# Patient Record
Sex: FEMALE
[De-identification: names, ages, dates, MRNs, and addresses within clinical notes are randomized per-mention and may not be internally consistent; named-entity substitution may affect disease eponyms.]

---

## 2020-06-09 ENCOUNTER — HOSPITAL ENCOUNTER (OUTPATIENT)
Dept: HOSPITAL 95 - LAB SHORT | Age: 30
Discharge: HOME | End: 2020-06-09
Attending: PHYSICIAN ASSISTANT
Payer: COMMERCIAL

## 2020-06-09 DIAGNOSIS — R06.00: Primary | ICD-10-CM

## 2020-06-09 LAB
ALBUMIN SERPL BCP-MCNC: 3.8 G/DL (ref 3.4–5)
ALBUMIN/GLOB SERPL: 0.8 {RATIO} (ref 0.8–1.8)
ALT SERPL W P-5'-P-CCNC: 43 U/L (ref 12–78)
ANION GAP SERPL CALCULATED.4IONS-SCNC: 10 MMOL/L (ref 6–16)
AST SERPL W P-5'-P-CCNC: 25 U/L (ref 12–37)
BASOPHILS # BLD AUTO: 0.04 K/MM3 (ref 0–0.23)
BASOPHILS NFR BLD AUTO: 1 % (ref 0–2)
BILIRUB SERPL-MCNC: 0.2 MG/DL (ref 0.1–1)
BUN SERPL-MCNC: 12 MG/DL (ref 8–24)
CALCIUM SERPL-MCNC: 8.8 MG/DL (ref 8.5–10.1)
CHLORIDE SERPL-SCNC: 106 MMOL/L (ref 98–108)
CO2 SERPL-SCNC: 27 MMOL/L (ref 21–32)
CREAT SERPL-MCNC: 0.78 MG/DL (ref 0.4–1)
DEPRECATED RDW RBC AUTO: 43.9 FL (ref 35.1–46.3)
EOSINOPHIL # BLD AUTO: 0.16 K/MM3 (ref 0–0.68)
EOSINOPHIL NFR BLD AUTO: 2 % (ref 0–6)
ERYTHROCYTE [DISTWIDTH] IN BLOOD BY AUTOMATED COUNT: 14.1 % (ref 11.7–14.2)
GLOBULIN SER CALC-MCNC: 4.9 G/DL (ref 2.2–4)
GLUCOSE SERPL-MCNC: 89 MG/DL (ref 70–99)
HCT VFR BLD AUTO: 40.7 % (ref 33–51)
HGB BLD-MCNC: 13.6 G/DL (ref 11.5–16)
IMM GRANULOCYTES # BLD AUTO: 0.02 K/MM3 (ref 0–0.1)
IMM GRANULOCYTES NFR BLD AUTO: 0 % (ref 0–1)
LYMPHOCYTES # BLD AUTO: 1.59 K/MM3 (ref 0.84–5.2)
LYMPHOCYTES NFR BLD AUTO: 22 % (ref 21–46)
MCHC RBC AUTO-ENTMCNC: 33.4 G/DL (ref 31.5–36.5)
MCV RBC AUTO: 86 FL (ref 80–100)
MONOCYTES # BLD AUTO: 0.66 K/MM3 (ref 0.16–1.47)
MONOCYTES NFR BLD AUTO: 9 % (ref 4–13)
NEUTROPHILS # BLD AUTO: 4.73 K/MM3 (ref 1.96–9.15)
NEUTROPHILS NFR BLD AUTO: 66 % (ref 41–73)
NRBC # BLD AUTO: 0 K/MM3 (ref 0–0.02)
NRBC BLD AUTO-RTO: 0 /100 WBC (ref 0–0.2)
PLATELET # BLD AUTO: 372 K/MM3 (ref 150–400)
POTASSIUM SERPL-SCNC: 4 MMOL/L (ref 3.5–5.5)
PROT SERPL-MCNC: 8.7 G/DL (ref 6.4–8.2)
SODIUM SERPL-SCNC: 143 MMOL/L (ref 136–145)

## 2022-07-26 ENCOUNTER — HOSPITAL ENCOUNTER (OUTPATIENT)
Dept: HOSPITAL 95 - LAB | Age: 32
Discharge: HOME | End: 2022-07-26
Payer: COMMERCIAL

## 2022-07-26 DIAGNOSIS — Z01.419: Primary | ICD-10-CM

## 2022-07-26 DIAGNOSIS — N76.0: ICD-10-CM

## 2022-07-26 PROCEDURE — G0123 SCREEN CERV/VAG THIN LAYER: HCPCS

## 2022-07-28 LAB — OTHER STN SPEC: (no result)

## 2022-08-26 ENCOUNTER — HOSPITAL ENCOUNTER (OUTPATIENT)
Dept: HOSPITAL 95 - ORSCSDS | Age: 32
Discharge: HOME | End: 2022-08-26
Attending: ORTHOPAEDIC SURGERY
Payer: COMMERCIAL

## 2022-08-26 VITALS — HEIGHT: 61 IN | WEIGHT: 246.04 LBS | BODY MASS INDEX: 46.45 KG/M2

## 2022-08-26 DIAGNOSIS — K21.9: ICD-10-CM

## 2022-08-26 DIAGNOSIS — G56.01: Primary | ICD-10-CM

## 2022-08-26 DIAGNOSIS — Z79.899: ICD-10-CM

## 2022-08-26 DIAGNOSIS — E66.01: ICD-10-CM

## 2022-08-26 PROCEDURE — 01N50ZZ RELEASE MEDIAN NERVE, OPEN APPROACH: ICD-10-PCS | Performed by: ORTHOPAEDIC SURGERY

## 2022-08-26 PROCEDURE — A9270 NON-COVERED ITEM OR SERVICE: HCPCS

## 2023-01-02 ENCOUNTER — OFFICE VISIT (OUTPATIENT)
Age: 33
End: 2023-01-02

## 2023-01-02 VITALS
OXYGEN SATURATION: 98 % | BODY MASS INDEX: 47.01 KG/M2 | HEART RATE: 87 BPM | WEIGHT: 249 LBS | HEIGHT: 61 IN | SYSTOLIC BLOOD PRESSURE: 108 MMHG | DIASTOLIC BLOOD PRESSURE: 62 MMHG | TEMPERATURE: 98.8 F

## 2023-01-02 DIAGNOSIS — M25.531 RIGHT WRIST PAIN: Primary | ICD-10-CM

## 2023-01-02 PROCEDURE — 99999 PR OFFICE/OUTPT VISIT,PROCEDURE ONLY: CPT | Performed by: NURSE PRACTITIONER

## 2023-01-02 NOTE — PROGRESS NOTES
Pt presents with wrist pain. Brayan Rodriguez wants her to be evaluated before she can return to work. Upon exam she has full ROM, no numbness, tingling. Offered xray and follow up with [facility]. Pt prefers to seek treatment at the [facility] as she wasn't aware that was an option. Pt agreed to plan. Left in stable condition.

## 2023-01-07 ENCOUNTER — OFFICE VISIT (OUTPATIENT)
Age: 33
End: 2023-01-07

## 2023-01-07 DIAGNOSIS — H65.113 ACUTE MUCOID OTITIS MEDIA OF BOTH EARS: Primary | ICD-10-CM

## 2023-01-07 DIAGNOSIS — J39.8 CONGESTION OF UPPER RESPIRATORY TRACT: ICD-10-CM

## 2023-01-07 DIAGNOSIS — J02.9 SORE THROAT: ICD-10-CM

## 2023-01-07 LAB
INFLUENZA A ANTIBODY: NEGATIVE
INFLUENZA B ANTIBODY: NEGATIVE
S PYO AG THROAT QL: NORMAL

## 2023-01-07 RX ORDER — AMOXICILLIN 500 MG/1
500 CAPSULE ORAL 2 TIMES DAILY
Qty: 20 CAPSULE | Refills: 0 | Status: SHIPPED | OUTPATIENT
Start: 2023-01-07 | End: 2023-01-17

## 2023-01-07 ASSESSMENT — ENCOUNTER SYMPTOMS
DIARRHEA: 0
EYE PAIN: 0
SORE THROAT: 1
SINUS PAIN: 1
COUGH: 1
SHORTNESS OF BREATH: 0
ABDOMINAL PAIN: 0
VOMITING: 0
ALLERGIC/IMMUNOLOGIC NEGATIVE: 1
SINUS PRESSURE: 0
NAUSEA: 0

## 2023-01-07 NOTE — PATIENT INSTRUCTIONS
Antibiotics sent to the pharmacy. Continue Flonase and Mucinex. Alternate Tylenol/Motrin as needed for fever. Encourage rest, increase fluids to prevent dehydration. Cool mist humidifier. Apply warm cloths to affected ears for comfort. Follow up with PCP or return to the clinic if symptoms worsen or fail to improve.

## 2023-01-07 NOTE — PROGRESS NOTES
[id number]  [address] [facility] [address] [id number]  Dept: [phone]  Dept Fax: [phone]  Loc: [phone]    Юлия Witt is a 28 y.o. female who presents today for her medical conditions/complaints as noted below. Юлия Witt is c/o of Pharyngitis, Pressure Behind the Eyes, Cough, Fever, and Headache        HPI:     Юлия Witt presents with complaints of sore throat, sinus pressure, cough, fever, and headache. Symptoms began 2-3 days ago. OTC treatment includes Mucinex, Cold & Flu, Flonase, and Tylenol. Denies recent antibiotics and steroids. Denies recent covid19 infection. History reviewed. No pertinent past medical history. Past Surgical History:   Procedure Laterality Date    CARPAL TUNNEL RELEASE Right 08/26/2022    done in [address] [place] [address] [address] per pt       History reviewed. No pertinent family history. Social History     Tobacco Use    Smoking status: Never    Smokeless tobacco: Never   Substance Use Topics    Alcohol use: Not on file      Current Outpatient Medications   Medication Sig Dispense Refill    amoxicillin (AMOXIL) 500 MG capsule Take 1 capsule by mouth 2 times daily for 10 days 20 capsule 0     No current facility-administered medications for this visit. Allergies   Allergen Reactions    Codeine        Health Maintenance   Topic Date Due    COVID-19 Vaccine (1) Never done    Varicella vaccine (1 of 2 - 2-dose childhood series) Never done    Depression Screen  Never done    HIV screen  Never done    Hepatitis C screen  Never done    DTaP/Tdap/Td vaccine (1 - Tdap) Never done    Cervical cancer screen  Never done    Flu vaccine (1) Never done    Hepatitis A vaccine  Aged Out    Hib vaccine  Aged Out    Meningococcal (ACWY) vaccine  Aged Out    Pneumococcal 0-64 years Vaccine  Aged Out       Subjective:     Review of Systems   Constitutional:  Positive for fever. Negative for chills and fatigue.   HENT:  Positive for congestion, postnasal drip, sinus pain and sore throat. Negative for sinus pressure.    Eyes:  Negative for pain and visual disturbance.   Respiratory:  Positive for cough. Negative for shortness of breath.    Cardiovascular:  Negative for chest pain.   Gastrointestinal:  Negative for abdominal pain, diarrhea, nausea and vomiting.   Endocrine: Negative for cold intolerance and heat intolerance.   Genitourinary:  Negative for frequency, hematuria and urgency.   Musculoskeletal:  Positive for myalgias.   Skin:  Negative for rash.   Allergic/Immunologic: Negative.    Neurological:  Positive for headaches. Negative for weakness and light-headedness.   Hematological: Negative.    Psychiatric/Behavioral: Negative.       :Objective      Physical Exam  Vitals reviewed.   Constitutional:       Appearance: She is ill-appearing.   HENT:      Head: Normocephalic and atraumatic.      Right Ear: Ear canal and external ear normal. A middle ear effusion is present. Tympanic membrane is bulging.      Left Ear: Ear canal and external ear normal. A middle ear effusion is present. Tympanic membrane is bulging.      Ears:      Comments: Large purulent fluid noted to both TM's     Nose: Congestion present.      Right Turbinates: Not swollen or pale.      Left Turbinates: Not swollen or pale.      Right Sinus: No maxillary sinus tenderness or frontal sinus tenderness.      Left Sinus: No maxillary sinus tenderness or frontal sinus tenderness.      Mouth/Throat:      Mouth: Mucous membranes are moist.      Pharynx: Posterior oropharyngeal erythema (mild- postnasal drainage) present. No pharyngeal swelling or oropharyngeal exudate.   Eyes:      General:         Right eye: No discharge.         Left eye: No discharge.      Conjunctiva/sclera: Conjunctivae normal.   Cardiovascular:      Rate and Rhythm: Normal rate and regular rhythm.   Pulmonary:      Effort: Pulmonary effort is normal. No respiratory distress.       Breath sounds: Normal breath sounds. Abdominal:      General: Abdomen is flat. Palpations: Abdomen is soft. Musculoskeletal:         General: Normal range of motion. Cervical back: Normal range of motion. Lymphadenopathy:      Cervical: No cervical adenopathy. Skin:     General: Skin is warm and dry. Capillary Refill: Capillary refill takes less than 2 seconds. Neurological:      General: No focal deficit present. Mental Status: She is alert. Psychiatric:         Mood and Affect: Mood normal.     LMP 10/04/2022     :Assessment       Diagnosis Orders   1. Acute mucoid otitis media of both ears  amoxicillin (AMOXIL) 500 MG capsule      2. Sore throat  POCT rapid strep A      3. Congestion of upper respiratory tract  POCT Influenza A/B          :Plan   Antibiotics sent to the pharmacy. Continue Flonase and Mucinex. Alternate Tylenol/Motrin as needed for fever. Encourage rest, increase fluids to prevent dehydration. Cool mist humidifier. Apply warm cloths to affected ears for comfort. Return precautions and home care education completed. Patient verbalized understanding. Orders Placed This Encounter   Procedures    POCT rapid strep A    POCT Influenza A/B       Results for orders placed or performed in visit on 01/07/23   POCT rapid strep A   Result Value Ref Range    Strep A Ag None Detected None Detected   POCT Influenza A/B   Result Value Ref Range    Influenza A Ab negative     Influenza B Ab negative        Return if symptoms worsen or fail to improve. Orders Placed This Encounter   Medications    amoxicillin (AMOXIL) 500 MG capsule     Sig: Take 1 capsule by mouth 2 times daily for 10 days     Dispense:  20 capsule     Refill:  0         Patient given educational materials- see patient instructions. Discussed use, benefit, and side effects of prescribed medications. All patient questions answered. Pt voiced understanding.      Patient Instructions   Antibiotics sent to the pharmacy. Continue Flonase and Mucinex. Alternate Tylenol/Motrin as needed for fever. Encourage rest, increase fluids to prevent dehydration. Cool mist humidifier. Apply warm cloths to affected ears for comfort. Follow up with PCP or return to the clinic if symptoms worsen or fail to improve.       Electronically signed by Richrd Gosselin, APRN - CNP on 1/7/2023 at 1:27 PM

## 2023-06-07 ENCOUNTER — APPOINTMENT (OUTPATIENT)
Dept: GENERAL RADIOLOGY | Age: 33
End: 2023-06-07
Payer: COMMERCIAL

## 2023-06-07 ENCOUNTER — HOSPITAL ENCOUNTER (EMERGENCY)
Age: 33
Discharge: HOME | End: 2023-06-07
Attending: EMERGENCY MEDICINE
Payer: COMMERCIAL

## 2023-06-07 VITALS
HEIGHT: 61 IN | WEIGHT: 246 LBS | RESPIRATION RATE: 16 BRPM | SYSTOLIC BLOOD PRESSURE: 148 MMHG | HEART RATE: 87 BPM | DIASTOLIC BLOOD PRESSURE: 89 MMHG | TEMPERATURE: 98.3 F | OXYGEN SATURATION: 98 % | BODY MASS INDEX: 46.44 KG/M2

## 2023-06-07 DIAGNOSIS — R07.89 MUSCULOSKELETAL CHEST PAIN: Primary | ICD-10-CM

## 2023-06-07 LAB
BACTERIA #/AREA URNS HPF: ABNORMAL /HPF
BILIRUB UR QL STRIP: NEGATIVE
CLARITY UR: ABNORMAL
COLOR UR: ABNORMAL
GLUCOSE UR STRIP.AUTO-MCNC: NEGATIVE MG/DL
HCG UR QL: NEGATIVE
HGB UR STRIP.AUTO-MCNC: ABNORMAL MG/L
KETONES UR STRIP.AUTO-MCNC: NEGATIVE MG/DL
LEUKOCYTE ESTERASE UR QL STRIP.AUTO: ABNORMAL
NITRITE UR QL STRIP.AUTO: NEGATIVE
PH UR STRIP.AUTO: 6 [PH] (ref 5–8)
PROT UR STRIP.AUTO-MCNC: 30 MG/DL
RBC #/AREA URNS HPF: ABNORMAL /HPF (ref 0–2)
SP GR UR STRIP.AUTO: 1.01 (ref 1–1.03)
SQUAMOUS #/AREA URNS HPF: ABNORMAL /HPF
UROBILINOGEN UR STRIP.AUTO-MCNC: 0.2 E.U./DL
WBC #/AREA URNS HPF: ABNORMAL /HPF (ref 0–5)

## 2023-06-07 PROCEDURE — 6370000000 HC RX 637 (ALT 250 FOR IP): Performed by: EMERGENCY MEDICINE

## 2023-06-07 PROCEDURE — 71045 X-RAY EXAM CHEST 1 VIEW: CPT

## 2023-06-07 PROCEDURE — 84703 CHORIONIC GONADOTROPIN ASSAY: CPT

## 2023-06-07 PROCEDURE — 81001 URINALYSIS AUTO W/SCOPE: CPT

## 2023-06-07 RX ORDER — NAPROXEN 250 MG/1
500 TABLET ORAL ONCE
Status: COMPLETED | OUTPATIENT
Start: 2023-06-07 | End: 2023-06-07

## 2023-06-07 RX ORDER — NAPROXEN 500 MG/1
500 TABLET ORAL 2 TIMES DAILY WITH MEALS
Qty: 20 TABLET | Refills: 0 | Status: SHIPPED | OUTPATIENT
Start: 2023-06-07 | End: 2023-06-17

## 2023-06-07 RX ADMIN — NAPROXEN 500 MG: 250 TABLET ORAL at 14:33

## 2023-06-07 ASSESSMENT — ENCOUNTER SYMPTOMS
GASTROINTESTINAL NEGATIVE: 1
RESPIRATORY NEGATIVE: 1
EYES NEGATIVE: 1

## 2023-06-07 ASSESSMENT — PAIN SCALES - GENERAL: PAINLEVEL_OUTOF10: 6
